# Patient Record
Sex: FEMALE | Race: WHITE
[De-identification: names, ages, dates, MRNs, and addresses within clinical notes are randomized per-mention and may not be internally consistent; named-entity substitution may affect disease eponyms.]

---

## 2017-10-23 ENCOUNTER — HOSPITAL ENCOUNTER (EMERGENCY)
Dept: HOSPITAL 47 - EC | Age: 27
Discharge: HOME | End: 2017-10-23
Payer: COMMERCIAL

## 2017-10-23 VITALS
RESPIRATION RATE: 12 BRPM | TEMPERATURE: 98.1 F | DIASTOLIC BLOOD PRESSURE: 66 MMHG | SYSTOLIC BLOOD PRESSURE: 109 MMHG | HEART RATE: 66 BPM

## 2017-10-23 DIAGNOSIS — F17.200: ICD-10-CM

## 2017-10-23 DIAGNOSIS — N39.0: ICD-10-CM

## 2017-10-23 DIAGNOSIS — Z53.20: ICD-10-CM

## 2017-10-23 DIAGNOSIS — E86.0: Primary | ICD-10-CM

## 2017-10-23 DIAGNOSIS — A05.9: ICD-10-CM

## 2017-10-23 LAB
ALP SERPL-CCNC: 86 U/L (ref 38–126)
ALT SERPL-CCNC: 24 U/L (ref 9–52)
ANION GAP SERPL CALC-SCNC: 9 MMOL/L
AST SERPL-CCNC: 26 U/L (ref 14–36)
BASOPHILS # BLD AUTO: 0.1 K/UL (ref 0–0.2)
BASOPHILS NFR BLD AUTO: 1 %
BUN SERPL-SCNC: 13 MG/DL (ref 7–17)
CALCIUM SPEC-MCNC: 9.6 MG/DL (ref 8.4–10.2)
CH: 23.6
CHCM: 29.9
CHLORIDE SERPL-SCNC: 104 MMOL/L (ref 98–107)
CO2 SERPL-SCNC: 25 MMOL/L (ref 22–30)
EOSINOPHIL # BLD AUTO: 0 K/UL (ref 0–0.7)
EOSINOPHIL NFR BLD AUTO: 1 %
ERYTHROCYTE [DISTWIDTH] IN BLOOD BY AUTOMATED COUNT: 4.47 M/UL (ref 3.8–5.4)
ERYTHROCYTE [DISTWIDTH] IN BLOOD: 16.8 % (ref 11.5–15.5)
GLUCOSE SERPL-MCNC: 109 MG/DL (ref 74–99)
HCT VFR BLD AUTO: 35.3 % (ref 34–46)
HDW: 3.25
HGB BLD-MCNC: 10.7 GM/DL (ref 11.4–16)
KETONES UR QL STRIP.AUTO: (no result)
LUC NFR BLD AUTO: 1 %
LYMPHOCYTES # SPEC AUTO: 1.3 K/UL (ref 1–4.8)
LYMPHOCYTES NFR SPEC AUTO: 14 %
MAGNESIUM SPEC-SCNC: 1.8 MG/DL (ref 1.6–2.3)
MCH RBC QN AUTO: 23.9 PG (ref 25–35)
MCHC RBC AUTO-ENTMCNC: 30.3 G/DL (ref 31–37)
MCV RBC AUTO: 79 FL (ref 80–100)
MONOCYTES # BLD AUTO: 0.5 K/UL (ref 0–1)
MONOCYTES NFR BLD AUTO: 5 %
NEUTROPHILS # BLD AUTO: 7.5 K/UL (ref 1.3–7.7)
NEUTROPHILS NFR BLD AUTO: 79 %
NON-AFRICAN AMERICAN GFR(MDRD): >60
PARTICLE COUNT: 2582
PH UR: 7.5 [PH] (ref 5–8)
PHOSPHATE SERPL-MCNC: 2.4 MG/DL (ref 2.5–4.5)
POTASSIUM SERPL-SCNC: 4.2 MMOL/L (ref 3.5–5.1)
PROT SERPL-MCNC: 7.7 G/DL (ref 6.3–8.2)
PROT UR QL: (no result)
RBC UR QL: 2 /HPF (ref 0–5)
SODIUM SERPL-SCNC: 138 MMOL/L (ref 137–145)
SP GR UR: 1.02 (ref 1–1.03)
SQUAMOUS UR QL AUTO: 5 /HPF (ref 0–4)
UA BILLING (MACRO VS. MICRO): (no result)
UROBILINOGEN UR QL STRIP: 2 MG/DL (ref ?–2)
WBC # BLD AUTO: 0.13 10*3/UL
WBC # BLD AUTO: 9.6 K/UL (ref 3.8–10.6)
WBC #/AREA URNS HPF: 7 /HPF (ref 0–5)
WBC (PEROX): 9.41

## 2017-10-23 PROCEDURE — 87491 CHLMYD TRACH DNA AMP PROBE: CPT

## 2017-10-23 PROCEDURE — 99284 EMERGENCY DEPT VISIT MOD MDM: CPT

## 2017-10-23 PROCEDURE — 84100 ASSAY OF PHOSPHORUS: CPT

## 2017-10-23 PROCEDURE — 80053 COMPREHEN METABOLIC PANEL: CPT

## 2017-10-23 PROCEDURE — 87591 N.GONORRHOEAE DNA AMP PROB: CPT

## 2017-10-23 PROCEDURE — 96375 TX/PRO/DX INJ NEW DRUG ADDON: CPT

## 2017-10-23 PROCEDURE — 96361 HYDRATE IV INFUSION ADD-ON: CPT

## 2017-10-23 PROCEDURE — 87186 SC STD MICRODIL/AGAR DIL: CPT

## 2017-10-23 PROCEDURE — 83735 ASSAY OF MAGNESIUM: CPT

## 2017-10-23 PROCEDURE — 81001 URINALYSIS AUTO W/SCOPE: CPT

## 2017-10-23 PROCEDURE — 85025 COMPLETE CBC W/AUTO DIFF WBC: CPT

## 2017-10-23 PROCEDURE — 83690 ASSAY OF LIPASE: CPT

## 2017-10-23 PROCEDURE — 80320 DRUG SCREEN QUANTALCOHOLS: CPT

## 2017-10-23 PROCEDURE — 74022 RADEX COMPL AQT ABD SERIES: CPT

## 2017-10-23 PROCEDURE — 36415 COLL VENOUS BLD VENIPUNCTURE: CPT

## 2017-10-23 PROCEDURE — 96374 THER/PROPH/DIAG INJ IV PUSH: CPT

## 2017-10-23 PROCEDURE — 81025 URINE PREGNANCY TEST: CPT

## 2017-10-23 PROCEDURE — 87077 CULTURE AEROBIC IDENTIFY: CPT

## 2017-10-23 PROCEDURE — 87086 URINE CULTURE/COLONY COUNT: CPT

## 2017-10-23 NOTE — XR
EXAMINATION TYPE: XR abdomen acute w cxr

 

DATE OF EXAM: 10/23/2017

 

COMPARISON: NONE

 

HISTORY: Nausea and vomiting for one month. No bowel movement and over month.

 

TECHNIQUE:  Supine, upright, and left side down lateral decubitus views of the abdomen are obtained.

 

FINDINGS:  

 

Chest: No focal consolidation, pleural effusion or pneumothorax is seen. Cardia mediastinal silhouett
e is within normal limits. Osseous structures appear intact.

 

ABDOMEN: There is a nonobstructive bowel gas pattern. Air-filled loops of large and small bowel are s
een with no dilation. No differential air-fluid levels are seen. Osseous structures appear intact. No
 abnormal calcifications are present.

 

IMPRESSION: 

No acute cardiopulmonary process or intra-abdominal process. Nonobstructive bowel gas pattern.

## 2017-10-23 NOTE — ED
General Adult HPI





- General


Chief complaint: Nausea/Vomiting/Diarrhea


Stated complaint: POSS FOOD POISENING


Time Seen by Provider: 10/23/17 10:56


Source: patient, RN notes reviewed, old records reviewed


Mode of arrival: ambulatory


Limitations: no limitations





- History of Present Illness


Initial comments: 





This is a 19-year-old female to the ER for evaluation regarding nausea vomiting 

diarrhea occasional abdominal pain.  Patient having abnormal bowel movements.  

Patient has no significant medical history, occasionally takes Motrin for pain.

  No fevers.  No travel history no sick contacts.  No similar symptoms at home.

  Patient states she has not had a normal bowel movement about a month.  She 

did notice diarrhea starting yesterday which is the first time that she states 

she's had a bowel movement since.  Patient feels nauseous has vomiting and 

unable to keep down food or fluids.  Patient denies pregnancy no surgical 

history takes no other medications





- Related Data


 Previous Rx's











 Medication  Instructions  Recorded


 


Nitrofurantoin Monohyd/M-Cryst 100 mg PO Q12HR #10 cap 10/23/17





[Macrobid]  


 


Ondansetron Odt [Zofran ODT] 4 mg PO Q8HR PRN #30 tab 10/23/17











 Allergies











Allergy/AdvReac Type Severity Reaction Status Date / Time


 


No Known Allergies Allergy   Verified 10/23/17 11:04














Review of Systems


ROS Statement: 


Those systems with pertinent positive or pertinent negative responses have been 

documented in the HPI.





ROS Other: All systems not noted in ROS Statement are negative.





Past Medical History


Past Medical History: No Reported History


History of Any Multi-Drug Resistant Organisms: None Reported


Past Surgical History: No Surgical Hx Reported


Past Anesthesia/Blood Transfusion Reactions: No Reported Reaction


Past Psychological History: ADD/ADHD, Anxiety, Depression


Smoking Status: Current every day smoker


Past Alcohol Use History: Rare


Past Drug Use History: None Reported





- Past Family History


  ** Father


Family Medical History: No Reported History





General Exam


Limitations: no limitations


General appearance: alert, in no apparent distress


Head exam: Present: atraumatic, normocephalic, normal inspection


Eye exam: Present: normal appearance, PERRL, EOMI.  Absent: scleral icterus, 

conjunctival injection, periorbital swelling


ENT exam: Present: normal exam, mucous membranes moist


Neck exam: Present: normal inspection.  Absent: tenderness, meningismus, 

lymphadenopathy


Respiratory exam: Present: normal lung sounds bilaterally.  Absent: respiratory 

distress, wheezes, rales, rhonchi, stridor


Cardiovascular Exam: Present: regular rate, normal rhythm, normal heart sounds.

  Absent: systolic murmur, diastolic murmur, rubs, gallop, clicks


GI/Abdominal exam: Present: soft, normal bowel sounds.  Absent: distended, 

tenderness, guarding, rebound, rigid


Extremities exam: Present: normal inspection, full ROM, normal capillary 

refill.  Absent: tenderness, pedal edema, joint swelling, calf tenderness


Back exam: Present: normal inspection


Neurological exam: Present: alert, oriented X3, CN II-XII intact


Psychiatric exam: Present: normal affect, normal mood


Skin exam: Present: warm, dry, intact, normal color.  Absent: rash





Course


 Vital Signs











  10/23/17





  10:50


 


Temperature 97.5 F L


 


Pulse Rate 105 H


 


Respiratory 18





Rate 


 


Blood Pressure 129/69


 


O2 Sat by Pulse 98





Oximetry 














- Reevaluation(s)


Reevaluation #1: 





10/23/17 13:07


Patient is in no acute distress, no active nausea or vomiting





Medical Decision Making





- Medical Decision Making





27 female in the ER for evaluation of nausea vomiting.  Patient has positive 

UTI.  Patient was treated with antibiotics, patient given nausea medication for 

nausea and vomiting.  Lab work is otherwise normal patient can be discharged 

home





- Lab Data


Result diagrams: 


 10/23/17 11:23





 10/23/17 11:23


 Lab Results











  10/23/17 10/23/17 10/23/17 Range/Units





  11:23 11:23 11:40 


 


WBC   9.6   (3.8-10.6)  k/uL


 


RBC   4.47   (3.80-5.40)  m/uL


 


Hgb   10.7 L   (11.4-16.0)  gm/dL


 


Hct   35.3   (34.0-46.0)  %


 


MCV   79.0 L   (80.0-100.0)  fL


 


MCH   23.9 L   (25.0-35.0)  pg


 


MCHC   30.3 L   (31.0-37.0)  g/dL


 


RDW   16.8 H   (11.5-15.5)  %


 


Plt Count   508 H   (150-450)  k/uL


 


Neutrophils %   79   %


 


Lymphocytes %   14   %


 


Monocytes %   5   %


 


Eosinophils %   1   %


 


Basophils %   1   %


 


Neutrophils #   7.5   (1.3-7.7)  k/uL


 


Lymphocytes #   1.3   (1.0-4.8)  k/uL


 


Monocytes #   0.5   (0-1.0)  k/uL


 


Eosinophils #   0.0   (0-0.7)  k/uL


 


Basophils #   0.1   (0-0.2)  k/uL


 


Hypochromasia   Marked   


 


Anisocytosis   Slight   


 


Microcytosis   Slight   


 


Sodium  138    (137-145)  mmol/L


 


Potassium  4.2    (3.5-5.1)  mmol/L


 


Chloride  104    ()  mmol/L


 


Carbon Dioxide  25    (22-30)  mmol/L


 


Anion Gap  9    mmol/L


 


BUN  13    (7-17)  mg/dL


 


Creatinine  0.59    (0.52-1.04)  mg/dL


 


Est GFR (MDRD) Af Amer  >60    (>60 ml/min/1.73 sqM)  


 


Est GFR (MDRD) Non-Af  >60    (>60 ml/min/1.73 sqM)  


 


Glucose  109 H    (74-99)  mg/dL


 


Calcium  9.6    (8.4-10.2)  mg/dL


 


Phosphorus  2.4 L    (2.5-4.5)  mg/dL


 


Magnesium  1.8    (1.6-2.3)  mg/dL


 


Total Bilirubin  1.0    (0.2-1.3)  mg/dL


 


AST  26    (14-36)  U/L


 


ALT  24    (9-52)  U/L


 


Alkaline Phosphatase  86    ()  U/L


 


Total Protein  7.7    (6.3-8.2)  g/dL


 


Albumin  4.5    (3.5-5.0)  g/dL


 


Lipase  62    ()  U/L


 


Urine Color    Yellow  


 


Urine Appearance    Cloudy H  (Clear)  


 


Urine pH    7.5  (5.0-8.0)  


 


Ur Specific Gravity    1.020  (1.001-1.035)  


 


Urine Protein    1+ H  (Negative)  


 


Urine Glucose (UA)    Negative  (Negative)  


 


Urine Ketones    2+ H  (Negative)  


 


Urine Blood    Negative  (Negative)  


 


Urine Nitrite    Negative  (Negative)  


 


Urine Bilirubin    Negative  (Negative)  


 


Urine Urobilinogen    2.0  (<2.0)  mg/dL


 


Ur Leukocyte Esterase    Large H  (Negative)  


 


Urine RBC    2  (0-5)  /hpf


 


Urine WBC    7 H  (0-5)  /hpf


 


Ur Squamous Epith Cells    5 H  (0-4)  /hpf


 


Urine Bacteria    Rare H  (None)  /hpf


 


Urine HCG, Qual     (Not Detectd)  


 


Serum Alcohol  <10    mg/dL














  10/23/17 Range/Units





  11:40 


 


WBC   (3.8-10.6)  k/uL


 


RBC   (3.80-5.40)  m/uL


 


Hgb   (11.4-16.0)  gm/dL


 


Hct   (34.0-46.0)  %


 


MCV   (80.0-100.0)  fL


 


MCH   (25.0-35.0)  pg


 


MCHC   (31.0-37.0)  g/dL


 


RDW   (11.5-15.5)  %


 


Plt Count   (150-450)  k/uL


 


Neutrophils %   %


 


Lymphocytes %   %


 


Monocytes %   %


 


Eosinophils %   %


 


Basophils %   %


 


Neutrophils #   (1.3-7.7)  k/uL


 


Lymphocytes #   (1.0-4.8)  k/uL


 


Monocytes #   (0-1.0)  k/uL


 


Eosinophils #   (0-0.7)  k/uL


 


Basophils #   (0-0.2)  k/uL


 


Hypochromasia   


 


Anisocytosis   


 


Microcytosis   


 


Sodium   (137-145)  mmol/L


 


Potassium   (3.5-5.1)  mmol/L


 


Chloride   ()  mmol/L


 


Carbon Dioxide   (22-30)  mmol/L


 


Anion Gap   mmol/L


 


BUN   (7-17)  mg/dL


 


Creatinine   (0.52-1.04)  mg/dL


 


Est GFR (MDRD) Af Amer   (>60 ml/min/1.73 sqM)  


 


Est GFR (MDRD) Non-Af   (>60 ml/min/1.73 sqM)  


 


Glucose   (74-99)  mg/dL


 


Calcium   (8.4-10.2)  mg/dL


 


Phosphorus   (2.5-4.5)  mg/dL


 


Magnesium   (1.6-2.3)  mg/dL


 


Total Bilirubin   (0.2-1.3)  mg/dL


 


AST   (14-36)  U/L


 


ALT   (9-52)  U/L


 


Alkaline Phosphatase   ()  U/L


 


Total Protein   (6.3-8.2)  g/dL


 


Albumin   (3.5-5.0)  g/dL


 


Lipase   ()  U/L


 


Urine Color   


 


Urine Appearance   (Clear)  


 


Urine pH   (5.0-8.0)  


 


Ur Specific Gravity   (1.001-1.035)  


 


Urine Protein   (Negative)  


 


Urine Glucose (UA)   (Negative)  


 


Urine Ketones   (Negative)  


 


Urine Blood   (Negative)  


 


Urine Nitrite   (Negative)  


 


Urine Bilirubin   (Negative)  


 


Urine Urobilinogen   (<2.0)  mg/dL


 


Ur Leukocyte Esterase   (Negative)  


 


Urine RBC   (0-5)  /hpf


 


Urine WBC   (0-5)  /hpf


 


Ur Squamous Epith Cells   (0-4)  /hpf


 


Urine Bacteria   (None)  /hpf


 


Urine HCG, Qual  Not Detected  (Not Detectd)  


 


Serum Alcohol   mg/dL














- Radiology Data


Radiology results: report reviewed (Chest x-ray is negative for acute disease, 

chest x-ray with abdominal series), image reviewed





Disposition


Clinical Impression: 


 Dehydration, Food poisoning, UTI (urinary tract infection)





Disposition: HOME SELF-CARE


Condition: Good


Instructions:  Food Poisoning (ED), Urinary Tract Infection in Women (ED)


Prescriptions: 


Nitrofurantoin Monohyd/M-Cryst [Macrobid] 100 mg PO Q12HR #10 cap


Ondansetron Odt [Zofran ODT] 4 mg PO Q8HR PRN #30 tab


 PRN Reason: nausea/vomiting


Referrals: 


None,Stated [Primary Care Provider] - 1-2 days

## 2019-09-04 ENCOUNTER — HOSPITAL ENCOUNTER (INPATIENT)
Dept: HOSPITAL 47 - FBPOP | Age: 29
LOS: 2 days | Discharge: HOME | End: 2019-09-06
Attending: OBSTETRICS & GYNECOLOGY | Admitting: OBSTETRICS & GYNECOLOGY
Payer: COMMERCIAL

## 2019-09-04 VITALS — BODY MASS INDEX: 21 KG/M2

## 2019-09-04 DIAGNOSIS — F17.210: ICD-10-CM

## 2019-09-04 DIAGNOSIS — O32.8XX0: Primary | ICD-10-CM

## 2019-09-04 DIAGNOSIS — Z3A.34: ICD-10-CM

## 2019-09-04 DIAGNOSIS — Z88.8: ICD-10-CM

## 2019-09-04 LAB
BASOPHILS # BLD AUTO: 0 K/UL (ref 0–0.2)
BASOPHILS NFR BLD AUTO: 1 %
EOSINOPHIL # BLD AUTO: 0.1 K/UL (ref 0–0.7)
EOSINOPHIL NFR BLD AUTO: 1 %
ERYTHROCYTE [DISTWIDTH] IN BLOOD BY AUTOMATED COUNT: 4.51 M/UL (ref 3.8–5.4)
ERYTHROCYTE [DISTWIDTH] IN BLOOD: 21 % (ref 11.5–15.5)
HCT VFR BLD AUTO: 28.8 % (ref 34–46)
HGB BLD-MCNC: 8.4 GM/DL (ref 11.4–16)
LYMPHOCYTES # SPEC AUTO: 2.3 K/UL (ref 1–4.8)
LYMPHOCYTES NFR SPEC AUTO: 27 %
MCH RBC QN AUTO: 18.6 PG (ref 25–35)
MCHC RBC AUTO-ENTMCNC: 29.1 G/DL (ref 31–37)
MCV RBC AUTO: 64 FL (ref 80–100)
MONOCYTES # BLD AUTO: 0.4 K/UL (ref 0–1)
MONOCYTES NFR BLD AUTO: 5 %
NEUTROPHILS # BLD AUTO: 5.6 K/UL (ref 1.3–7.7)
NEUTROPHILS NFR BLD AUTO: 64 %
PH UR: 7.5 [PH] (ref 5–8)
PLATELET # BLD AUTO: 397 K/UL (ref 150–450)
RBC UR QL: 2 /HPF (ref 0–5)
SP GR UR: 1.01 (ref 1–1.03)
SQUAMOUS UR QL AUTO: <1 /HPF (ref 0–4)
UROBILINOGEN UR QL STRIP: <2 MG/DL (ref ?–2)
WBC # BLD AUTO: 8.7 K/UL (ref 3.8–10.6)
WBC #/AREA URNS HPF: 78 /HPF (ref 0–5)

## 2019-09-04 PROCEDURE — 86900 BLOOD TYPING SEROLOGIC ABO: CPT

## 2019-09-04 PROCEDURE — 87491 CHLMYD TRACH DNA AMP PROBE: CPT

## 2019-09-04 PROCEDURE — 86780 TREPONEMA PALLIDUM: CPT

## 2019-09-04 PROCEDURE — 80306 DRUG TEST PRSMV INSTRMNT: CPT

## 2019-09-04 PROCEDURE — 86901 BLOOD TYPING SEROLOGIC RH(D): CPT

## 2019-09-04 PROCEDURE — 87591 N.GONORRHOEAE DNA AMP PROB: CPT

## 2019-09-04 PROCEDURE — 76815 OB US LIMITED FETUS(S): CPT

## 2019-09-04 PROCEDURE — 87340 HEPATITIS B SURFACE AG IA: CPT

## 2019-09-04 PROCEDURE — 87390 HIV-1 AG IA: CPT

## 2019-09-04 PROCEDURE — 86762 RUBELLA ANTIBODY: CPT

## 2019-09-04 PROCEDURE — 82947 ASSAY GLUCOSE BLOOD QUANT: CPT

## 2019-09-04 PROCEDURE — 85025 COMPLETE CBC W/AUTO DIFF WBC: CPT

## 2019-09-04 PROCEDURE — 88307 TISSUE EXAM BY PATHOLOGIST: CPT

## 2019-09-04 PROCEDURE — 99213 OFFICE O/P EST LOW 20 MIN: CPT

## 2019-09-04 PROCEDURE — 86850 RBC ANTIBODY SCREEN: CPT

## 2019-09-04 PROCEDURE — 81001 URINALYSIS AUTO W/SCOPE: CPT

## 2019-09-04 RX ADMIN — DOCUSATE SODIUM AND SENNOSIDES SCH: 50; 8.6 TABLET ORAL at 21:55

## 2019-09-04 RX ADMIN — ONDANSETRON PRN MG: 2 INJECTION INTRAMUSCULAR; INTRAVENOUS at 19:38

## 2019-09-04 RX ADMIN — KETOROLAC TROMETHAMINE PRN MG: 30 INJECTION, SOLUTION INTRAMUSCULAR at 19:38

## 2019-09-04 RX ADMIN — POTASSIUM CHLORIDE SCH: 14.9 INJECTION, SOLUTION INTRAVENOUS at 21:54

## 2019-09-04 RX ADMIN — POTASSIUM CHLORIDE SCH MLS/HR: 14.9 INJECTION, SOLUTION INTRAVENOUS at 15:37

## 2019-09-04 NOTE — US
EXAMINATION TYPE: US OB limited

 

DATE OF EXAM: 9/4/2019

 

COMPARISON: NONE

 

CLINICAL HISTORY: no prenatal care, position, placental location

 

EXAM PERFORMED:  Transabdominal (TA)

 

GESTATIONAL AGE / DATING

Physician Established: unknown

 

FETAL SURVEY

 

PLACENTA:  Anterior

 

Test done while patient in labor, technologist unable to well reach patient, lights bright, patient s
igning forms during exam.

 

Confirmed head in upper quadrant, breech presentation, anterior placenta, with Dr. Villalobos in room.

 

 

PRESENTATION: Breech

 

PLACENTA: Anterior

 

IMPRESSION:  Confirmation of breech position and anterior placenta with Dr. Villalobos observing during the
 examination. Fetal heart tones were not documented on this exam.

## 2019-09-04 NOTE — HP
HISTORY AND PHYSICAL



DATE OF SERVICE:

2019



HISTORY:

This is a 28-year-old female,  7, para 6, with an EDC of  based on an

ultrasound performed at the pregnancy care center earlier in the pregnancy.  This put

her at 34 and 4/7 weeks.  She came in with complaints of vaginal bleeding and

contractions that were strong and regular.  She denied any rupture of membranes.  She

stated she had no prenatal care during this pregnancy but did know that she was having

a boy.  Upon arrival, she was noted to have a bulging bag of water, and presenting part

was not palpable.  A bedside ultrasound was performed in the triage unit and, by my

estimation, it appeared to be a footling breech presentation.  Official ultrasound was

called and they did perform an ultrasound that did confirm breech presentation and

anterior placenta.  No previa was noted.



PAST OBSTETRICAL HISTORY:

G7, P6.  History of 6 vaginal deliveries that she states were all at term.  She denies

any problems with any of the pregnancies but states that she did not have prenatal care

with her last 3 pregnancies.  She did not go into detail as to why she did not.



GYNECOLOGIC HISTORY:

She denies any history of sexually transmitted diseases.



PAST MEDICAL HISTORY:

She denies any medical problems.



PAST SURGICAL HISTORY:

She denies any history of surgeries.



ALLERGIES:

She believes she is allergic to REGLAN, which is a medication used to help with breast-

feeding in the past.  She does not give a reaction to the Reglan.



MEDICATIONS:

None.



SOCIAL HISTORY:

She denies any alcohol or street drugs, including marijuana.  She does state she smokes

about a pack of cigarettes a day.  She is not currently  but does have a

boyfriend with her.



REVIEW OF SYSTEMS:

Limited due to severe pain that the patient is in.



PHYSICAL EXAMINATION:

HEENT:  Poor dentition.

HEART:  Regular rate and rhythm.

LUNGS:  Clear to auscultation bilaterally.

ABDOMEN:  .

CERVIX:  Completely dilated with bulging bag and feet palpable within the bag.

FETAL HEART TONES:  Reactive with category 1 tracing.

CONTRACTIONS:  Every 2-3 minutes.

EXTREMITIES:  Negative Homans.



IMPRESSION:

1. Intrauterine pregnancy at approximately 34 and 4/7 weeks based on patient history.

2. Footling breech presentation.

3. No prenatal care.



PLAN:

Admission with all prenatal labs and urine drug screen.  The patient was counseled

regarding need for  section and initially refused, but when I discussed this

further with her regarding what her fears were regarding  section, she was

concerned with any needles.  I advised her that based on the fact that she is

completely dilated and the baby's feet are in the vagina, we could definitely do this

under a general anesthesia, and she was agreeable to doing that.



I have discussed the risks, benefits and alternative therapies for the above-mentioned

procedure and for both sedation/analgesia as well as necessary blood product

administration, if indicated, as they pertain to this patient.  The patient has

indicated her understanding and acceptance of the risks and procedures discussed.



The patient will be prepped and taken to OR urgently.





MMBRITTAL / DIORN: [id number] / Job#: [id number]

## 2019-09-04 NOTE — P.OP
Date of Procedure: 19


Preoperative Diagnosis: 


1.  Intrauterine pregnancy at approximately 34-4/7 weeks.


2.  Footling breech.


3.  No prenatal care.


4.  Active labor





Postoperative Diagnosis: 


Same


Procedure(s) Performed: 


Primary low transverse  section


Anesthesia: ALLISON


Surgeon: Gladys Villalobos


Assistant #1: Iliana Mendoza


Estimated Blood Loss (ml): 600


Pathology: other (Placenta)


Condition: stable


Disposition: floor


Indications for Procedure: 


This is a 28-year-old female  7 para 6 at approximately 34-4/7 weeks 

based on a ultrasound at the pregnancy care Center earlier in the pregnancy who 

presents with contractions and bleeding.  She is found to be completely dilated 

with a bulging bag and feet palpated in the vagina.  Bedside ultrasound an 

official ultrasound confirmed breech presentation.  She is counseled on the 

risks of a vaginal breech presentation and is encouraged to have an urgent 

 section.  She agreed to  section only under general anesthesia.





I have discussed the risks, benefits, and alternative therapies for the 

above-mentioned procedure and for both sedation/anesthesia as well as necessary 

blood products administration, if indicated, as they pertain to this patient.  

The patient has indicated her understanding and acceptance of the risks and 

procedures discussed.





Operative Findings: 


A viable male infant is noted in the footling breech presentation with Apgar 

scores of 6 at 1 minute and 8 at 5 minutes and infant weight of 4 lbs. 3 oz.  

Normal uterus tubes and ovaries are noted.


Description of Procedure: 


The patient is taken to the operating room where she is placed in the dorsal 

supine position with leftward tilt.  She is prepped and draped in the normal 

sterile fashion.  General anesthesia is given.   A Pfannenstiel skin incision 

was made with a scalpel.  A second knife was used to carry the incision down to 

the underlying layer of fascia.  The fascia was nicked in the midline with a 

scalpel and then extended laterally bilaterally with Muñoz scissors.  The 

anterior lip of the fascia was grasped with 2 Kocher clamps and then dissected 

off the underlying rectus muscle in the midline with Muñoz scissors.  The 

inferior aspect of the fascial incision was grasped with 2 Kocher clamps and 

dissected off the underlying rectus muscle and the midline with Muñoz scissors.  

Next the peritoneum layer was tented up with 2 hemostats and then entered 

sharply with the scalpel.  The incision is extended superiorly and inferiorly 

with Metzenbaum scissors.  Next a DeLee retractor is placed.  The vesicouterine 

peritoneum is entered sharply with Metzenbaum scissors and extended laterally 

bilaterally with Metzenbaum scissors and then the bladder flap is pushed 

inferiorly.  The lower uterine segment is incised in transverse fashion with the

scalpel and then bluntly entered with a hemostat.  Clear fluid is noted.  The 

incision was then extended laterally bilaterally with 2 fingers.  Next the 

infant's feet and buttocks are delivered through the incision.  Next the trunk 

is delivered followed by each arm in a flexed position.  Next the head is 

delivered in a flexed position.  Cord is clamped and cut.  Infant is taken to 

warmer by nursing staff for awaiting pediatrician evaluation.  Cord blood is 

obtained secondary to no prenatal care.  Uterine fundus is gently massaged and 

placenta is delivered manually.  Uterus is exteriorized and cleared of all clots

and debris.  Uterine incision is closed with 0 Vicryl suture in a running locked

fashion.  A second layer of 0 Vicryl suture is used in a running fashion for 

hemostasis.  Once adequate hemostasis as assured, the vesicouterine peritoneum 

is reapproximated with 2-0 Vicryl suture in a running fashion.  Posterior 

cul-de-sac is suctioned of all clots and debris.  Uterus is returned to the 

abdomen.  Incision is noted to be hemostatic.  Peritoneal layer is closed with 0

Vicryl suture in a running fashion.  Muscle layer is reapproximated with 0 

Vicryl suture in interrupted fashion.  Fascia layer is then closed with 0 PDS 

suture with 2 sutures meeting in the midline and the knots buried in either side

and in the midline.  The subcutaneous tissue was then closed with 2-0 Vicryl 

suture.  Skin layer was then closed with staples.  All sponge and needle counts 

are correct.  The patient is taken to recovery room in stable condition.

## 2019-09-05 LAB
BASOPHILS # BLD AUTO: 0 K/UL (ref 0–0.2)
BASOPHILS NFR BLD AUTO: 0 %
EOSINOPHIL # BLD AUTO: 0 K/UL (ref 0–0.7)
EOSINOPHIL NFR BLD AUTO: 0 %
ERYTHROCYTE [DISTWIDTH] IN BLOOD BY AUTOMATED COUNT: 4.14 M/UL (ref 3.8–5.4)
ERYTHROCYTE [DISTWIDTH] IN BLOOD: 20.8 % (ref 11.5–15.5)
HCT VFR BLD AUTO: 27.2 % (ref 34–46)
HGB BLD-MCNC: 7.5 GM/DL (ref 11.4–16)
LYMPHOCYTES # SPEC AUTO: 1.6 K/UL (ref 1–4.8)
LYMPHOCYTES NFR SPEC AUTO: 7 %
MCH RBC QN AUTO: 18.2 PG (ref 25–35)
MCHC RBC AUTO-ENTMCNC: 27.7 G/DL (ref 31–37)
MCV RBC AUTO: 65.7 FL (ref 80–100)
MONOCYTES # BLD AUTO: 0.8 K/UL (ref 0–1)
MONOCYTES NFR BLD AUTO: 3 %
NEUTROPHILS # BLD AUTO: 19.4 K/UL (ref 1.3–7.7)
NEUTROPHILS NFR BLD AUTO: 88 %
PLATELET # BLD AUTO: 268 K/UL (ref 150–450)
WBC # BLD AUTO: 22.1 K/UL (ref 3.8–10.6)

## 2019-09-05 RX ADMIN — KETOROLAC TROMETHAMINE PRN MG: 30 INJECTION, SOLUTION INTRAMUSCULAR at 08:30

## 2019-09-05 RX ADMIN — DOCUSATE SODIUM AND SENNOSIDES SCH: 50; 8.6 TABLET ORAL at 08:31

## 2019-09-05 RX ADMIN — ONDANSETRON PRN MG: 2 INJECTION INTRAMUSCULAR; INTRAVENOUS at 02:51

## 2019-09-05 RX ADMIN — POTASSIUM CHLORIDE SCH: 14.9 INJECTION, SOLUTION INTRAVENOUS at 23:11

## 2019-09-05 RX ADMIN — DOCUSATE SODIUM AND SENNOSIDES SCH: 50; 8.6 TABLET ORAL at 21:16

## 2019-09-05 RX ADMIN — IBUPROFEN PRN MG: 600 TABLET ORAL at 20:44

## 2019-09-05 RX ADMIN — KETOROLAC TROMETHAMINE PRN MG: 30 INJECTION, SOLUTION INTRAMUSCULAR at 02:26

## 2019-09-05 RX ADMIN — KETOROLAC TROMETHAMINE PRN MG: 30 INJECTION, SOLUTION INTRAMUSCULAR at 15:07

## 2019-09-05 RX ADMIN — POTASSIUM CHLORIDE SCH MLS/HR: 14.9 INJECTION, SOLUTION INTRAVENOUS at 02:29

## 2019-09-05 RX ADMIN — HYDROCODONE BITARTRATE AND ACETAMINOPHEN PRN EACH: 7.5; 325 TABLET ORAL at 23:10

## 2019-09-05 RX ADMIN — POTASSIUM CHLORIDE SCH: 14.9 INJECTION, SOLUTION INTRAVENOUS at 16:45

## 2019-09-05 NOTE — P.PNOBGPC
Subjective





- Subjective


Principal diagnosis: Status post primary  section postoperative day #1


Interval history: 





Patient is doing okay.  Her lochia is decreasing.  Her pain is fairly well 

controlled at this time.  She denies any flatus yet.  She has been burping.


Patient reports: Reports appetite normal, Reports voiding normally, Reports pain

well controlled, Reports ambulating normally


: transported (Transported to [facility] due to gestational age)





Objective





- Vital Signs


Latest vital signs: 


                                   Vital Signs











  Temp Pulse Resp BP Pulse Ox


 


 19 03:56  97.9 F  57 L  16  90/45  99


 


 19 00:01      96


 


 19 00:00  97.9 F  40 L  16  112/57  89 L


 


 19 20:00  97.9 F  61  16  99/34  96


 


 19 16:56   55 L  16  110/66 


 


 19 16:11   62  17  92/64 


 


 19 15:56   59 L  17  94/61 


 


 19 15:41   56 L  16  124/78 


 


 19 15:26   53 L  16  111/81  97


 


 19 15:11   53 L  17  106/79 


 


 19 14:56   53 L  16  108/75  95


 


 19 14:40  97.4 F L  73  17  103/60  95








                                Intake and Output











 19





 22:59 06:59 14:59


 


Intake Total  1000 


 


Output Total 600 250 


 


Balance -600 750 


 


Intake:   


 


  Intake, IV Titration  1000 





  Amount   


 


    Lactated Ringers 1,000 ml  1000 





    @ 125 mls/hr IV .Q8H [facility]   





    Rx#:[id number]   


 


Output:   


 


  Urine 600 250 


 


    Uretheral ([place]) 100  














- Exam


Extremities: Present: normal.  Absent: tenderness, edema


Abdomen: Present: normal appearance, soft (Faint bowel sounds 4).  Absent: 

distention, tenderness


Incision: Present: normal, dry, intact.  Absent: erythematous


Uterus: Present: normal, firm.  Absent: tenderness





- Labs


Labs: 


                  Abnormal Lab Results - Last 24 Hours (Table)











  19 Range/Units





  13:45 13:45 13:45 


 


WBC     (3.8-10.6)  k/uL


 


Hgb  8.4 L    (11.4-16.0)  gm/dL


 


Hct  28.8 L    (34.0-46.0)  %


 


MCV  64.0 L    (80.0-100.0)  fL


 


MCH  18.6 L    (25.0-35.0)  pg


 


MCHC  29.1 L    (31.0-37.0)  g/dL


 


RDW  21.0 H    (11.5-15.5)  %


 


Neutrophils #     (1.3-7.7)  k/uL


 


Ur Leukocyte Esterase   Large H   (Negative)  


 


Urine WBC   78 H   (0-5)  /hpf


 


Urine WBC Clumps   Few H   (None)  /hpf


 


U Marijuana (THC) Screen    Detected H  (NotDetected)  














  19 Range/Units





  06:22 


 


WBC  22.1 H  (3.8-10.6)  k/uL


 


Hgb  7.5 L  (11.4-16.0)  gm/dL


 


Hct  27.2 L  (34.0-46.0)  %


 


MCV  65.7 L  (80.0-100.0)  fL


 


MCH  18.2 L  (25.0-35.0)  pg


 


MCHC  27.7 L  (31.0-37.0)  g/dL


 


RDW  20.8 H  (11.5-15.5)  %


 


Neutrophils #  19.4 H  (1.3-7.7)  k/uL


 


Ur Leukocyte Esterase   (Negative)  


 


Urine WBC   (0-5)  /hpf


 


Urine WBC Clumps   (None)  /hpf


 


U Marijuana (THC) Screen   (NotDetected)  














Assessment and Plan


Assessment: 





Status post primary  section postoperative day #1


Plan: 





Will continue with postoperative and postpartum care today.  If doing well, 

maybe up to discharge home tomorrow.  Will switch to oral pain medications 

today.

## 2019-09-06 VITALS
TEMPERATURE: 97.8 F | RESPIRATION RATE: 18 BRPM | SYSTOLIC BLOOD PRESSURE: 121 MMHG | HEART RATE: 78 BPM | DIASTOLIC BLOOD PRESSURE: 65 MMHG

## 2019-09-06 LAB
BASOPHILS # BLD AUTO: 0 K/UL (ref 0–0.2)
BASOPHILS NFR BLD AUTO: 0 %
EOSINOPHIL # BLD AUTO: 0.1 K/UL (ref 0–0.7)
EOSINOPHIL NFR BLD AUTO: 1 %
ERYTHROCYTE [DISTWIDTH] IN BLOOD BY AUTOMATED COUNT: 3.38 M/UL (ref 3.8–5.4)
ERYTHROCYTE [DISTWIDTH] IN BLOOD: 20.8 % (ref 11.5–15.5)
HCT VFR BLD AUTO: 21.6 % (ref 34–46)
HGB BLD-MCNC: 6.2 GM/DL (ref 11.4–16)
LYMPHOCYTES # SPEC AUTO: 2.4 K/UL (ref 1–4.8)
LYMPHOCYTES NFR SPEC AUTO: 16 %
MCH RBC QN AUTO: 18.4 PG (ref 25–35)
MCHC RBC AUTO-ENTMCNC: 28.9 G/DL (ref 31–37)
MCV RBC AUTO: 63.8 FL (ref 80–100)
MONOCYTES # BLD AUTO: 0.6 K/UL (ref 0–1)
MONOCYTES NFR BLD AUTO: 4 %
NEUTROPHILS # BLD AUTO: 11.9 K/UL (ref 1.3–7.7)
NEUTROPHILS NFR BLD AUTO: 77 %
PLATELET # BLD AUTO: 273 K/UL (ref 150–450)
WBC # BLD AUTO: 15.4 K/UL (ref 3.8–10.6)

## 2019-09-06 RX ADMIN — HYDROCODONE BITARTRATE AND ACETAMINOPHEN PRN EACH: 7.5; 325 TABLET ORAL at 15:15

## 2019-09-06 RX ADMIN — IBUPROFEN PRN MG: 600 TABLET ORAL at 06:26

## 2019-09-06 RX ADMIN — HYDROCODONE BITARTRATE AND ACETAMINOPHEN PRN EACH: 7.5; 325 TABLET ORAL at 08:15

## 2019-09-06 RX ADMIN — DOCUSATE SODIUM AND SENNOSIDES SCH EACH: 50; 8.6 TABLET ORAL at 08:15

## 2019-09-06 RX ADMIN — IBUPROFEN PRN MG: 600 TABLET ORAL at 13:17

## 2019-09-06 RX ADMIN — POTASSIUM CHLORIDE SCH: 14.9 INJECTION, SOLUTION INTRAVENOUS at 06:27

## 2019-09-06 NOTE — P.DS
Providers


Date of admission: 


19 13:35





Expected date of discharge: 19


Attending physician: 


Gladys Villalobos





Primary care physician: 


Stated None





Hospital Course: 





This is a 29-year-old female  7 para 6 with an estimated date of 

confinement of 10/11/2019, estimated gestational age of 34-4/7 weeks, who 

presented with no prenatal care she the triage department and was found to be 

fully dilated with with a bulging bag and footling breech presentation.  She 

underwent an urgent primary low transverse  section under general 

anesthesia and delivered a viable male infant in the footling breech 

presentation with Apgar scores of 6 at 1 minute and 8 at 5 minutes and infant 

weight of 4 lbs. 3 oz.  Her postoperative course has been essentially 

uncomplicated.  She is ambulating she is passing flatus she denies any bowel mo

vement.  Lochia is decreasing.  Pain is fairly well controlled with ibuprofen 

and Norco.  Her baby was transferred due to prematurity.  She does plan to 

breast-feed.  Vital signs are stable.  Abdomen is soft with fundus firm and 

nontender.  Incision is clean dry and intact.  Extremities show negative Homans.

 Impression is status post primary  section postoperative day #2.  Plan 

is to discharge home today.  Patient was advised that her hemoglobin did drop 

from 8.4 down to approximate 6.2 today.  She denies any symptoms and therefore 

is encouraged to continue to take prenatal vitamins and iron.  She is advised to

follow up in the office in 1 week for postoperative check.  She is advised to 

call the office if she has any further questions or concerns prior to her 

appointment time.


Procedures: 





Primary low transverse  section on 2019


Patient Condition at Discharge: Stable





Plan - Discharge Summary


New Discharge Prescriptions: 


New


   Ibuprofen [Motrin] 600 mg PO Q6HR PRN #60 tab


     PRN Reason: Mild Pain Or Fever >= 100.5


   HYDROcodone/APAP 7.5-325MG [Norco 7.5-325] 1 each PO Q6H PRN #28 tab


     PRN Reason: Severe Pain


Discharge Medication List





HYDROcodone/APAP 7.5-325MG [Norco 7.5-325] 1 each PO Q6H PRN #28 tab 19 

[Rx]


Ibuprofen [Motrin] 600 mg PO Q6HR PRN #60 tab 19 [Rx]








Follow up Appointment(s)/Referral(s): 


Gladys Villalobos DO [Doctor of Osteopathic Medicine] - 1 Week


Activity/Diet/Wound Care/Special Instructions: 


Postpartum Instructions





1.  Do not begin any exercise program for 3 weeks.


2.  Do not resume sexual relations for 3 weeks or longer if uncomfortable.


3.  You may take tub baths or showers at any time.


4.  You may use tampons if desired after 3 weeks.


5.  Keep the area of episiotomy (stitches) clean and dry.


6.  If you are not nursing, wear a good fitting, supportive bra during the day 

and limit fluid intake for at least 1 week to prevent breast engorgement.


7.  Call the office, [phone], within the next week to make appointment for your

6 week checkup if it has not already been made.


8.  Report any of the following occurrences to the doctor promptly:


     a.  Heavy, excessive bleeding


     b.  Chills, fever


     c.  Burning or frequency of urination


     d.  Pain or redness and breasts if nursing


     e.  Increasing pain or swelling in episiotomy (stitches).











In addition to the above instructions, the following additional should be 

followed:


1.  No heavy lifting or straining (exercising) until after 6 week checkup.


2.  Keep abdominal incision clean and dry: You may wear a dressing if more 

comfortable.


3.  Make office appointment for 10 days after going home or as instructed by her

doctor.


Discharge Disposition: HOME SELF-CARE